# Patient Record
Sex: FEMALE | Race: WHITE
[De-identification: names, ages, dates, MRNs, and addresses within clinical notes are randomized per-mention and may not be internally consistent; named-entity substitution may affect disease eponyms.]

---

## 2017-03-06 ENCOUNTER — HOSPITAL ENCOUNTER (OUTPATIENT)
Dept: HOSPITAL 45 - C.LABMFLN | Age: 82
Discharge: HOME | End: 2017-03-06
Attending: FAMILY MEDICINE
Payer: COMMERCIAL

## 2017-03-06 DIAGNOSIS — I10: ICD-10-CM

## 2017-03-06 DIAGNOSIS — E78.00: ICD-10-CM

## 2017-03-06 DIAGNOSIS — G45.3: Primary | ICD-10-CM

## 2017-03-06 DIAGNOSIS — E11.9: ICD-10-CM

## 2017-03-06 LAB
ALT SERPL-CCNC: 26 U/L (ref 12–78)
ANION GAP SERPL CALC-SCNC: 8 MMOL/L (ref 3–11)
BUN SERPL-MCNC: 18 MG/DL (ref 7–18)
BUN/CREAT SERPL: 14.8 (ref 10–20)
CALCIUM SERPL-MCNC: 9.2 MG/DL (ref 8.5–10.1)
CHLORIDE SERPL-SCNC: 105 MMOL/L (ref 98–107)
CO2 SERPL-SCNC: 30 MMOL/L (ref 21–32)
CREAT SERPL-MCNC: 1.2 MG/DL (ref 0.6–1.2)
EOSINOPHIL NFR BLD AUTO: 165 K/UL (ref 130–400)
EST. AVERAGE GLUCOSE BLD GHB EST-MCNC: 140 MG/DL
GLUCOSE SERPL-MCNC: 134 MG/DL (ref 70–99)
HCT VFR BLD CALC: 45.4 % (ref 37–47)
MCH RBC QN AUTO: 30.9 PG (ref 25–34)
MCHC RBC AUTO-ENTMCNC: 32.8 G/DL (ref 32–36)
MCV RBC AUTO: 94.2 FL (ref 80–100)
PMV BLD AUTO: 11.3 FL (ref 7.4–10.4)
POTASSIUM SERPL-SCNC: 4.3 MMOL/L (ref 3.5–5.1)
RBC # BLD AUTO: 4.82 M/UL (ref 4.2–5.4)
SODIUM SERPL-SCNC: 143 MMOL/L (ref 136–145)
TSH SERPL-ACNC: 2.32 UIU/ML (ref 0.3–4.5)
WBC # BLD AUTO: 8.87 K/UL (ref 4.8–10.8)

## 2017-03-07 ENCOUNTER — HOSPITAL ENCOUNTER (OUTPATIENT)
Dept: HOSPITAL 45 - C.ULTR | Age: 82
Discharge: HOME | End: 2017-03-07
Attending: FAMILY MEDICINE
Payer: COMMERCIAL

## 2017-03-07 DIAGNOSIS — G45.3: Primary | ICD-10-CM

## 2017-03-07 NOTE — DIAGNOSTIC IMAGING REPORT
BILATERAL CAROTID DOPPLER STUDY



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



COMPARISON: None.



TECHNIQUE: Real-time, grayscale, and color Doppler sonography of the carotid

arteries was performed. Imaging reviewed in the transverse and longitudinal

planes. All measurements were calculated based on NASCET criteria.



FINDINGS:

Antegrade flow is seen in the bilateral vertebral arteries. 

The brachial pressures are hemodynamically similar.

Minimal calcified plaque within the right carotid bulb.



The peak systolic velocity within the right ICA is 72 cm/s. The right systolic

ratio is 1.2.



The peak systolic velocity within the left ICA is 87 cm/s. The left systolic

ratio is 1.0.







IMPRESSION:  

No hemodynamically significant stenosis seen within the carotid arteries.







Electronically signed by:  Rangel Diaz M.D.

3/7/2017 10:07 AM



Dictated Date/Time:  3/7/2017 10:06 AM

## 2017-10-02 ENCOUNTER — HOSPITAL ENCOUNTER (OUTPATIENT)
Dept: HOSPITAL 45 - C.MAMM | Age: 82
Discharge: HOME | End: 2017-10-02
Attending: FAMILY MEDICINE
Payer: COMMERCIAL

## 2017-10-02 DIAGNOSIS — Z12.31: Primary | ICD-10-CM

## 2017-10-02 NOTE — MAMMOGRAPHY REPORT
BILATERAL DIGITAL SCREENING MAMMOGRAM WITH CAD: 10/2/2017

CLINICAL HISTORY: Routine screening.  Patient has no complaints.  





TECHNIQUE: Bilateral CC, MLO and XCCL views were obtained.  Current study was also evaluated with a C
H[employer]ills[employer] Aided Detection (CAD) system.  



COMPARISON: Comparison is made to exams dated:  9/30/2016 mammogram, 9/29/2015 mammogram, 6/17/2014 m
ammogram, 6/13/2013 mammogram, 6/7/2012 mammogram, and 4/8/2011 mammogram - [facility].   



BREAST COMPOSITION:  The tissue of both breasts is almost entirely fatty.  



FINDINGS: There are mild vascular calcifications in the breasts.  No suspicious mass, architectural d
istortion or cluster of microcalcifications is seen.  



IMPRESSION:  ACR BI-RADS CATEGORY 1: NEGATIVE

There is no mammographic evidence of malignancy. A 1 year screening mammogram is recommended.  The pa
tient will receive written notification of the results.  





Approximately 10% of breast cancers are not detected with mammography. A negative mammographic report
 should not delay biopsy if a clinically suggestive mass is present.



Maria De Jesus Calloway M.D.          

ay/:10/2/2017 14:43:22  



Imaging Technologist: Simi Combs, [facility]

letter sent: Normal 1/2  

BI-RADS Code: ACR BI-RADS Category 1: Negative

## 2018-03-12 ENCOUNTER — HOSPITAL ENCOUNTER (OUTPATIENT)
Dept: HOSPITAL 45 - C.LABMFLN | Age: 83
Discharge: HOME | End: 2018-03-12
Attending: FAMILY MEDICINE
Payer: COMMERCIAL

## 2018-03-12 DIAGNOSIS — R05: Primary | ICD-10-CM

## 2018-04-25 ENCOUNTER — HOSPITAL ENCOUNTER (OUTPATIENT)
Dept: HOSPITAL 45 - C.LABMFLN | Age: 83
Discharge: HOME | End: 2018-04-25
Attending: UROLOGY
Payer: COMMERCIAL

## 2018-04-25 DIAGNOSIS — R35.0: ICD-10-CM

## 2018-04-25 DIAGNOSIS — R32: Primary | ICD-10-CM
